# Patient Record
Sex: MALE | Race: WHITE
[De-identification: names, ages, dates, MRNs, and addresses within clinical notes are randomized per-mention and may not be internally consistent; named-entity substitution may affect disease eponyms.]

---

## 2018-04-30 ENCOUNTER — HOSPITAL ENCOUNTER (EMERGENCY)
Dept: HOSPITAL 56 - MW.ED | Age: 28
Discharge: HOME | End: 2018-04-30
Payer: COMMERCIAL

## 2018-04-30 DIAGNOSIS — G43.909: Primary | ICD-10-CM

## 2018-04-30 DIAGNOSIS — F17.210: ICD-10-CM

## 2018-04-30 DIAGNOSIS — Z88.0: ICD-10-CM

## 2018-04-30 PROCEDURE — 96372 THER/PROPH/DIAG INJ SC/IM: CPT

## 2018-04-30 PROCEDURE — 99283 EMERGENCY DEPT VISIT LOW MDM: CPT

## 2018-04-30 PROCEDURE — 87389 HIV-1 AG W/HIV-1&-2 AB AG IA: CPT

## 2018-04-30 NOTE — EDM.PDOC
ED HPI GENERAL MEDICAL PROBLEM





- General


Chief Complaint: Headache


Stated Complaint: MIGRAINE


Time Seen by Provider: 04/30/18 20:14


Source of Information: Reports: Patient


History Limitations: Reports: No Limitations





- History of Present Illness


INITIAL COMMENTS - FREE TEXT/NARRATIVE: 





HISTORY AND PHYSICAL:


[]8-year-old male presenting with migraine headache he had an aura about noon 

today he took his Excedrin Migraine and some B12 which usually helps


Headache has been present about 12:30





History of Present Illness:


[]he has recently moved here from [place]. He was diagnosed with migraines that 

this is no different than the migraines she has had in the past.


Nausea


Patient has not been seen by a neurologist





Review of Systems:


As per history of present illness and below otherwise all 


systems reviewed and negative.  





Past medical history:


As per history of present illness and as reviewed below


otherwise noncontributory.





Surgical history:


As per history of present illness and as reviewed below


otherwise noncontributory.





Social history:


No reported history of drug or alcohol abuse.





Family history:


As per history of present illness and as reviewed below


otherwise noncontributory.





Physical exam:


Alert gentleman who is photophobic. Answering questions appropriately in full 

sentences without any shortness of breath


HEENT: Atraumatic, normocehpalic, pupils reactive, negative for conjunctival 

pallor or scleral icterus, mucous membranes moist, throat clear, neck supple, 

nontender, trachea midline.  


Lungs: Clear to auscultation, breath sounds equal bilaterally, chest non 

tender.  


Heart: S1S2, regular, negative for clicks, rubs, or JVD.


Abdomen: Soft, nondistended, nontender.  Negative for masses or 

hepatossplenmegaly. Negative for costovertebral tenderness.


Pelvis: Deferred


Genitourinary: Deferred.


Rectal: Deferred


Extremities: Atraumatic, negative for cords or calf pain.  


Neurovascular unremarkable.


Neuro:  Awake, alert, oriented.  Cranial nerves II through XII


unremarkable.  Cerebellum unremarkable.  Motor and sensory unremarkable 

throughout.  Exam nonfocal.  





Diagnostics:


[]





Therapeutics:


[]Ativan


Benadryl


Compazine


Toradol





Impression:


[]Migraine





Plan:


[]


Discharge home


Go home and sleep


Referral to Dr. Karen Lopez [facility]


Specialty Care - Neurology


[facility]


[address] [place] [address], [address] 


[place] [address]


Phone: [phone]


Fax: [phone]





Return to the emergency room as needed and discussed





Definitive disposition and diagnosis as appropriate pending


reevaluation and review of above.  





  ** head


Pain Score (Numeric/FACES): 7





- Related Data


 Allergies











Allergy/AdvReac Type Severity Reaction Status Date / Time


 


Penicillins Allergy  Other Verified 04/30/18 19:31











Home Meds: 


 Home Meds





. [No Known Home Meds]  04/30/18 [History]











Past Medical History


HEENT History: Reports: None


Cardiovascular History: Reports: None


Respiratory History: Reports: None


Gastrointestinal History: Reports: None


Genitourinary History: Reports: None


Other Musculoskeletal History: patient reports broke left arm when 8


Neurological History: Reports: Migraines


Psychiatric History: Reports: None


Endocrine/Metabolic History: Reports: None


Hematologic History: Reports: None


Dermatologic History: Reports: None





- Infectious Disease History


Infectious Disease History: Reports: C-Difficile





- Past Surgical History


Male  Surgical History: Reports: None





Social & Family History





- Family History


Family Medical History: Noncontributory





- Tobacco Use


Smoking Status *Q: Current Every Day Smoker


Years of Tobacco use: 3


Packs/Tins Daily: 0.5





- Recreational Drug Use


Recreational Drug Use: No





ED ROS GENERAL





- Review of Systems


Review Of Systems: ROS reveals no pertinent complaints other than HPI.





- Physical Exam


Exam: See Below





Course





- Vital Signs


Last Recorded V/S: 





 Last Vital Signs











Temp  37.1 C   04/30/18 19:32


 


Pulse  71   04/30/18 19:32


 


Resp  14   04/30/18 19:32


 


BP  148/73 H  04/30/18 19:32


 


Pulse Ox  97   04/30/18 19:32














Departure





- Departure


Time of Disposition: 20:18


Disposition: Home, Self-Care 01


Condition: Good


Clinical Impression: 


 Migraine








- Discharge Information


Instructions:  Recurrent Migraine Headache, Easy-to-Read


Referrals: 


PCP,None [Primary Care Provider] - 


Karen oLpez MD [Physician] - 


Additional Instructions: 


The following information is given to patients seen in the emergency department 

who are being discharged to home. This information is to outline your options 

for follow-up care. We provide all patients seen in our emergency department 

with a follow-up referral.





The need for follow-up, as well as the timing and circumstances, are variable 

depending upon the specifics of your emergency department visit.





If you don't have a primary care physician on staff, we will provide you with a 

referral. We always advise you to contact your personal physician following an 

emergency department visit to inform them of the circumstance of the visit and 

for follow-up with them and/or the need for any referrals to a consulting 

specialist.





The emergency department will also refer you to a specialist when appropriate. 

This referral assures that you have the opportunity for followup care with a 

specialist. All of these measure are taken in an effort to provide you with 

optimal care, which includes your followup.





Under all circumstances we always encourage you to contact your private 

physician who remains a resource for coordinating  your care. When calling for 

followup care, please make the office aware that this follow-up is from your 

recent emergency room visit. If for any reason you are refused follow-up, 

please contact the [facility] emergency department at [phone] 

and asked to speak to the emergency department charge nurse.





Follow-up with Dr. Karen Lopez [facility]


Specialty Care - Neurology


[facility]


[address] [place] [address], [address] 


[place] [address]


Phone: [phone]


Fax: [phone]








Return to emergency room as needed